# Patient Record
Sex: MALE | Race: BLACK OR AFRICAN AMERICAN | NOT HISPANIC OR LATINO | Employment: UNEMPLOYED | URBAN - METROPOLITAN AREA
[De-identification: names, ages, dates, MRNs, and addresses within clinical notes are randomized per-mention and may not be internally consistent; named-entity substitution may affect disease eponyms.]

---

## 2023-03-29 ENCOUNTER — OFFICE VISIT (OUTPATIENT)
Dept: CARDIOLOGY | Facility: CLINIC | Age: 33
End: 2023-03-29
Payer: COMMERCIAL

## 2023-03-29 VITALS
OXYGEN SATURATION: 97 % | WEIGHT: 315 LBS | HEART RATE: 75 BPM | RESPIRATION RATE: 18 BRPM | SYSTOLIC BLOOD PRESSURE: 160 MMHG | DIASTOLIC BLOOD PRESSURE: 84 MMHG | BODY MASS INDEX: 45.1 KG/M2 | HEIGHT: 70 IN

## 2023-03-29 DIAGNOSIS — I10 PRIMARY HYPERTENSION: ICD-10-CM

## 2023-03-29 DIAGNOSIS — R07.2 PRECORDIAL PAIN: Primary | ICD-10-CM

## 2023-03-29 DIAGNOSIS — E78.49 OTHER HYPERLIPIDEMIA: ICD-10-CM

## 2023-03-29 PROCEDURE — 93000 ELECTROCARDIOGRAM COMPLETE: CPT | Performed by: INTERNAL MEDICINE

## 2023-03-29 PROCEDURE — 1160F RVW MEDS BY RX/DR IN RCRD: CPT | Performed by: INTERNAL MEDICINE

## 2023-03-29 PROCEDURE — 1159F MED LIST DOCD IN RCRD: CPT | Performed by: INTERNAL MEDICINE

## 2023-03-29 PROCEDURE — 99204 OFFICE O/P NEW MOD 45 MIN: CPT | Performed by: INTERNAL MEDICINE

## 2023-03-29 RX ORDER — ERGOCALCIFEROL 1.25 MG/1
CAPSULE ORAL
COMMUNITY
Start: 2023-03-24

## 2023-03-29 RX ORDER — ATORVASTATIN CALCIUM 20 MG/1
20 TABLET, FILM COATED ORAL
COMMUNITY
Start: 2022-12-29

## 2023-03-29 RX ORDER — LOSARTAN POTASSIUM 25 MG/1
1 TABLET ORAL EVERY 12 HOURS SCHEDULED
COMMUNITY
Start: 2023-01-10

## 2023-03-29 NOTE — PROGRESS NOTES
"      CARDIOLOGY    Ayla Guidry MD    ENCOUNTER DATE:  03/29/2023    Luigi Monet / 32 y.o. / male        CHIEF COMPLAINT / REASON FOR OFFICE VISIT     Heart Problem (Enlarged Heart )      HISTORY OF PRESENT ILLNESS       HPI    Luigi Monet is a 32 y.o. male     This is a gentleman with hypertension, hyperlipidemia, and obesity. At some point he was told that he had an enlarged heart. He has never had an echo and I cannot find a chest x-ray so I am not 100% sure where this diagnosis came from. He has some sharp superficial chest pain worse with certain positions and motions that comes and goes and has been going on for about 4 years. He has gained quite a bit of weight. He has a back injury that limits his activity. His mother tells him that he stops breathing at night and she is terrified to go to sleep because of it.       REVIEW OF SYSTEMS     Review of Systems   Constitutional: Negative for chills, fever, weight gain and weight loss.   Cardiovascular: Negative for leg swelling.   Respiratory: Positive for snoring. Negative for cough and wheezing.    Hematologic/Lymphatic: Negative for bleeding problem. Does not bruise/bleed easily.   Skin: Negative for color change.   Musculoskeletal: Negative for falls, joint pain and myalgias.   Gastrointestinal: Negative for melena.   Genitourinary: Negative for hematuria.   Neurological: Negative for excessive daytime sleepiness.   Psychiatric/Behavioral: Negative for depression. The patient is not nervous/anxious.          VITAL SIGNS     Visit Vitals  /84 (BP Location: Left arm, Patient Position: Sitting, Cuff Size: Large Adult)   Pulse 75   Resp 18   Ht 176.5 cm (69.5\")   Wt (!) 152 kg (334 lb)   SpO2 97%   BMI 48.62 kg/m²         Wt Readings from Last 3 Encounters:   03/29/23 (!) 152 kg (334 lb)     Body mass index is 48.62 kg/m².      PHYSICAL EXAMINATION     Constitutional:       General: Not in acute distress.  Neck:      Vascular: No carotid " bruit or JVD.   Pulmonary:      Effort: Pulmonary effort is normal.      Breath sounds: Normal breath sounds.   Cardiovascular:      Normal rate. Regular rhythm.      Murmurs: There is no murmur.   Psychiatric:         Mood and Affect: Mood and affect normal.           REVIEWED DATA       ECG 12 Lead    Date/Time: 3/29/2023 11:04 AM  Performed by: Ayla Guidry MD  Authorized by: Ayla Guidry MD   Previous ECG: no previous ECG available  Rhythm: sinus rhythm  BPM: 75  Conduction: conduction normal  ST Segments: ST segments normal  T Waves: T waves normal    Clinical impression: normal ECG                  Lab Results   Component Value Date    BUN 22 (H) 11/15/2019    CREATININE 0.9 11/15/2019    BCR 24.4 11/15/2019    K 4.2 11/15/2019    CO2 28 11/15/2019    CALCIUM 9.9 11/15/2019    ALBUMIN 5.0 11/15/2019    BILITOT 0.7 11/15/2019    AST 35 11/15/2019    ALT 43 11/15/2019       ASSESSMENT & PLAN      Diagnosis Plan   1. Precordial pain  Home Sleep Study      2. Primary hypertension  Home Sleep Study      3. Other hyperlipidemia  Home Sleep Study        Hypertension. Blood pressure is not well controlled. I have asked him to get a blood cuff, monitor his blood pressure at home, bring in readings and his cuff to his follow-up appointment with [facility]. We can make adjustments to his blood pressure medication.   Hyperlipidemia. I do not have a lipid profile to review. He is on atorvastatin.   Obstructive sleep apnea. He has symptoms very consistent with obstructive sleep apnea. I have ordered a home sleep study. I think he will benefit from treatment of the sleep apnea.  Obesity. He understands that he needs to lose weight. A lot of his issues with blood pressure and sleep apnea have come on since he has gained weight.    He is going to follow-up with [facility] in about 4 weeks to follow-up on his blood pressure specifically and hopefully I will have the results of his sleep study.       Orders Placed This Encounter    Procedures   • ECG 12 Lead     This order was created via procedure documentation     Order Specific Question:   Release to patient     Answer:   Routine Release   • Home Sleep Study     Standing Status:   Future     Standing Expiration Date:   3/29/2024     Order Specific Question:   May take own meds     Answer:   Yes     Order Specific Question:   If any contraindications for HST are checked, patient may be recommended for in-lab testing. HST is indicated for patients in whom MARK is  suspected diagnosis.     Answer:   Does not apply           MEDICATIONS         Discharge Medications          Accurate as of March 29, 2023 11:06 AM. If you have any questions, ask your nurse or doctor.            Continue These Medications      Instructions Start Date   atorvastatin 20 MG tablet  Commonly known as: LIPITOR   20 mg, Oral, Every Night at Bedtime      losartan 25 MG tablet  Commonly known as: COZAAR   1 tablet, Oral, Every 12 Hours Scheduled      metoprolol tartrate 25 MG tablet  Commonly known as: LOPRESSOR   1 tablet, Oral, Daily      vitamin D 1.25 MG (46406 UT) capsule capsule  Commonly known as: ERGOCALCIFEROL   No dose, route, or frequency recorded.               Ayla Guidry MD  03/29/23  11:06 EDT    Part of this note may be an electronic transcription/translation of spoken language to printed text using the Dragon dictation system.

## 2023-05-03 ENCOUNTER — HOSPITAL ENCOUNTER (OUTPATIENT)
Dept: SLEEP MEDICINE | Facility: HOSPITAL | Age: 33
Discharge: HOME | End: 2023-05-03
Admitting: INTERNAL MEDICINE
Payer: COMMERCIAL

## 2023-05-03 PROCEDURE — 95806 SLEEP STUDY UNATT&RESP EFFT: CPT

## 2023-05-04 ENCOUNTER — OFFICE VISIT (OUTPATIENT)
Dept: CARDIOLOGY | Facility: CLINIC | Age: 33
End: 2023-05-04
Payer: COMMERCIAL

## 2023-05-04 VITALS
BODY MASS INDEX: 45.1 KG/M2 | HEIGHT: 70 IN | DIASTOLIC BLOOD PRESSURE: 100 MMHG | HEART RATE: 85 BPM | WEIGHT: 315 LBS | SYSTOLIC BLOOD PRESSURE: 142 MMHG | OXYGEN SATURATION: 96 %

## 2023-05-04 DIAGNOSIS — E78.49 OTHER HYPERLIPIDEMIA: ICD-10-CM

## 2023-05-04 DIAGNOSIS — R07.2 PRECORDIAL PAIN: ICD-10-CM

## 2023-05-04 DIAGNOSIS — I10 PRIMARY HYPERTENSION: Primary | ICD-10-CM

## 2023-05-04 PROCEDURE — 1159F MED LIST DOCD IN RCRD: CPT | Performed by: NURSE PRACTITIONER

## 2023-05-04 PROCEDURE — 99214 OFFICE O/P EST MOD 30 MIN: CPT | Performed by: NURSE PRACTITIONER

## 2023-05-04 PROCEDURE — 1160F RVW MEDS BY RX/DR IN RCRD: CPT | Performed by: NURSE PRACTITIONER

## 2023-05-04 NOTE — PROGRESS NOTES
"Date of Office Visit: 23  Encounter Provider: ABBY Ang  Place of Service: [facility] CARDIOLOGY  Patient Name: Luigi Monet  :1990    Chief Complaint   Patient presents with   • Follow-up   • Hypertension   • Chest Pain   :     HPI: Luigi Monet is a 32 y.o. male  With hypertension, hyperlipidemia, witnessed apnea and obesity.   He has history of prior back injury which limits his mobility.  He had a chest x-ray at [facility] in 2019 which showed cardiac and mediastinal contours were within normal limits for size.  He was seen by Dr. Ayla Guidry in 2023 and complained of sharp chest pain that was worse with position changes.  This was reportedly chronic and intermittent over 4 years.  He has prior back injury that limits his mobility.  Witnessed apnea was discussed and home sleep study was ordered.  He was advised to purchase a blood pressure cuff and start monitoring his blood pressures at home.    He presents today for reassessment.  He had his sleep study completed yesterday so we are waiting on the results of that.  He continues to complain of chest pain.  He describes it as a tenderness with certain movements.  He denies shortness of breath or edema.  He has not purchased a blood pressure cuff so he has not been able to check his blood pressure values at home. He does not add salt to food but his mother uses a lot of salt when cooking so he eats whatever she makes. His mother has recently started to cut back on added salt while cooking.       Allergies   Allergen Reactions   • Lisinopril Cough           Family and social history reviewed.     ROS  All other systems were reviewed and are negative          Objective:     Vitals:    23 1531   BP: 142/100   BP Location: Left arm   Patient Position: Sitting   Pulse: 85   SpO2: 96%   Weight: (!) 148 kg (325 lb 6.4 oz)   Height: 176.5 cm (69.5\")     Body mass index is 47.36 " kg/m².    PHYSICAL EXAM:  Pulmonary:      Effort: Pulmonary effort is normal.      Breath sounds: Normal breath sounds.   Cardiovascular:      Normal rate. Regular rhythm.         Procedures      Current Outpatient Medications   Medication Sig Dispense Refill   • atorvastatin (LIPITOR) 20 MG tablet Take 1 tablet by mouth every night at bedtime.     • losartan (COZAAR) 25 MG tablet Take 1 tablet by mouth Every 12 (Twelve) Hours.     • metoprolol tartrate (LOPRESSOR) 25 MG tablet Take 1 tablet by mouth Daily.     • vitamin D (ERGOCALCIFEROL) 1.25 MG (02602 UT) capsule capsule        No current facility-administered medications for this visit.     Assessment:       Diagnosis Plan   1. Primary hypertension  Adult Transthoracic Echo Complete W/ Cont if Necessary Per Protocol      2. Other hyperlipidemia        3. Precordial pain  Adult Transthoracic Echo Complete W/ Cont if Necessary Per Protocol           Orders Placed This Encounter   Procedures   • Adult Transthoracic Echo Complete W/ Cont if Necessary Per Protocol     Standing Status:   Future     Standing Expiration Date:   5/3/2024     Order Specific Question:   Reason for exam?     Answer:   Chest Pain     Order Specific Question:   Chest pain specification?     Answer:   Atypical Chest Pain         Plan:   1.  32-year-old gentleman with hypertension.  His blood pressure is not controlled so I will increase losartan from 25 mg to 50 mg.  He tells me he will go purchase a cuff today and I gave him a list to keep track of his blood pressure and pulse.  We will arrange blood pressure cuff check in the office towards the end of the month as well. I told him that I will come into the room briefly at his CP check.    I will also arrange for an echocardiogram to evaluate internal structure and function given his atypical symptoms.  2.  Hyperipidemia on atorvastatin followed by PCP  3.  Witnessed apnea.  He had sleep study completed yesterday so we will wait on the  results for that  4.  Obesity-BMI 47.36.  A .  He would benefit from weight loss                It has been a pleasure to participate in this patient's care.      Thank you,  ABBY Ang      **I used Dragon to dictate this note:**

## 2023-05-04 NOTE — LETTER
May 4, 2023       No Recipients    Patient: Luigi Monet   YOB: 1990   Date of Visit: 2023       Dear Dr. Morales Recipients:    Thank you for referring Luigi Monet to me for evaluation. Below are the relevant portions of my assessment and plan of care.    If you have questions, please do not hesitate to call me. I look forward to following Luigi along with you.         Sincerely,        ABBY Ang        CC:   No Recipients    Linh Murray APRN  23 1736  Signed  Date of Office Visit: 23  Encounter Provider: ABBY Ang  Place of Service: [facility] CARDIOLOGY  Patient Name: Luigi Monet  :1990    Chief Complaint   Patient presents with   • Follow-up   • Hypertension   • Chest Pain   :     HPI: Luigi Monet is a 32 y.o. male  With hypertension, hyperlipidemia, witnessed apnea and obesity.   He has history of prior back injury which limits his mobility.  He had a chest x-ray at [facility] in 2019 which showed cardiac and mediastinal contours were within normal limits for size.  He was seen by Dr. Ayla Guidry in 2023 and complained of sharp chest pain that was worse with position changes.  This was reportedly chronic and intermittent over 4 years.  He has prior back injury that limits his mobility.  Witnessed apnea was discussed and home sleep study was ordered.  He was advised to purchase a blood pressure cuff and start monitoring his blood pressures at home.    He presents today for reassessment.  He had his sleep study completed yesterday so we are waiting on the results of that.  He continues to complain of chest pain.  He describes it as a tenderness with certain movements.  He denies shortness of breath or edema.  He has not purchased a blood pressure cuff so he has not been able to check his blood pressure values at home. He does not add salt to food but his mother uses a lot of salt when  "cooking so he eats whatever she makes. His mother has recently started to cut back on added salt while cooking.       Allergies   Allergen Reactions   • Lisinopril Cough           Family and social history reviewed.     ROS  All other systems were reviewed and are negative         Objective:     Vitals:    05/04/23 1531   BP: 142/100   BP Location: Left arm   Patient Position: Sitting   Pulse: 85   SpO2: 96%   Weight: (!) 148 kg (325 lb 6.4 oz)   Height: 176.5 cm (69.5\")     Body mass index is 47.36 kg/m².    PHYSICAL EXAM:  Pulmonary:      Effort: Pulmonary effort is normal.      Breath sounds: Normal breath sounds.   Cardiovascular:      Normal rate. Regular rhythm.         Procedures      Current Outpatient Medications   Medication Sig Dispense Refill   • atorvastatin (LIPITOR) 20 MG tablet Take 1 tablet by mouth every night at bedtime.     • losartan (COZAAR) 25 MG tablet Take 1 tablet by mouth Every 12 (Twelve) Hours.     • metoprolol tartrate (LOPRESSOR) 25 MG tablet Take 1 tablet by mouth Daily.     • vitamin D (ERGOCALCIFEROL) 1.25 MG (18629 UT) capsule capsule        No current facility-administered medications for this visit.     Assessment:       Diagnosis Plan   1. Primary hypertension  Adult Transthoracic Echo Complete W/ Cont if Necessary Per Protocol      2. Other hyperlipidemia        3. Precordial pain  Adult Transthoracic Echo Complete W/ Cont if Necessary Per Protocol           Orders Placed This Encounter   Procedures   • Adult Transthoracic Echo Complete W/ Cont if Necessary Per Protocol     Standing Status:   Future     Standing Expiration Date:   5/3/2024     Order Specific Question:   Reason for exam?     Answer:   Chest Pain     Order Specific Question:   Chest pain specification?     Answer:   Atypical Chest Pain         Plan:   1.  32-year-old gentleman with hypertension.  His blood pressure is not controlled so I will increase losartan from 25 mg to 50 mg.  He tells me he will go purchase " a cuff today and I gave him a list to keep track of his blood pressure and pulse.  We will arrange blood pressure cuff check in the office towards the end of the month as well. I told him that I will come into the room briefly at his CP check.    I will also arrange for an echocardiogram to evaluate internal structure and function given his atypical symptoms.  2.  Hyperipidemia on atorvastatin followed by PCP  3.  Witnessed apnea.  He had sleep study completed yesterday so we will wait on the results for that  4.  Obesity-BMI 47.36.  A .  He would benefit from weight loss                It has been a pleasure to participate in this patient's care.      Thank you,  ABBY Ang      **I used Dragon to dictate this note:**

## 2023-05-16 ENCOUNTER — TELEPHONE (OUTPATIENT)
Dept: SLEEP MEDICINE | Facility: HOSPITAL | Age: 33
End: 2023-05-16
Payer: COMMERCIAL

## 2023-05-31 ENCOUNTER — OFFICE VISIT (OUTPATIENT)
Dept: SLEEP MEDICINE | Facility: HOSPITAL | Age: 33
End: 2023-05-31

## 2023-05-31 VITALS — OXYGEN SATURATION: 97 % | BODY MASS INDEX: 45.1 KG/M2 | HEART RATE: 75 BPM | HEIGHT: 70 IN | WEIGHT: 315 LBS

## 2023-05-31 DIAGNOSIS — G47.36 HYPOXEMIA ASSOCIATED WITH SLEEP: ICD-10-CM

## 2023-05-31 DIAGNOSIS — G47.14 HYPERSOMNIA DUE TO MEDICAL CONDITION: ICD-10-CM

## 2023-05-31 DIAGNOSIS — E66.01 CLASS 3 SEVERE OBESITY DUE TO EXCESS CALORIES WITH SERIOUS COMORBIDITY AND BODY MASS INDEX (BMI) OF 45.0 TO 49.9 IN ADULT: ICD-10-CM

## 2023-05-31 DIAGNOSIS — G47.33 OSA (OBSTRUCTIVE SLEEP APNEA): Primary | ICD-10-CM

## 2023-05-31 PROCEDURE — G0463 HOSPITAL OUTPT CLINIC VISIT: HCPCS

## 2023-05-31 NOTE — PROGRESS NOTES
[facility] New Patient/Consultation       Reason for Consultation: MARK    Patient Care Team:  Maria Del Rosario Elizabeth APRN as PCP - General (Nurse Practitioner)  Lui Gonzalez MD as Consulting Physician (Sleep Medicine)    Chief complaint: MARK    History of present illness:    Thank you for asking me to see your patient.  The patient is a 33 y.o. male who has known hypertension, hyperlipidemia, and obesity. At some point he was told that he had an enlarged heart. He has never had an echo. He has some sharp superficial chest pain worse with certain positions and motions that comes and goes and has been going on for about 4 years. He has gained quite a bit of weight. He has a back injury that limits his activity. His mother tells him that he stops breathing at night and she is terrified to go to sleep because of it.  Cardiology recommended a home sleep study.    Home sleep study performed 5/3/2023.  Severe MARK with AHI 81.8 events per hour noted.  Low oxygen saturation 40% and sleep-related hypoxia present for 144.3 minutes sleep evaluation requested.     The patient reports restless sleep and twitching and jerking in his sleep and sleep talking.  The patient goes to bed between 11 PM and midnight and awakens between 6 and 8 AM.  He is tired upon arising.  He will take 1 nap daily.  He has complaints of hypersomnolence but his [place] Sleepiness Scale is normal at 2-4.  He will use the restroom 1-3 times nightly.  His sleep is restless.      Review of Systems:    A complete review of systems was done and all were negative with the exception of the above    History:  Past Medical History:   Diagnosis Date   • BMI 45.0-49.9, adult     from [facility]'s note 12/14/22- 48.01   • HTN (hypertension)     from [facility]'s note 12/14/22   • Hyperlipidemia     from [facility]'s note 12/14/22   • MARK (obstructive sleep apnea) 05/03/2023    Home sleep study.  Weight 334 pounds.  Severe MARK with AHI  "81.8 events per hour.  Low oxygen saturation 40% and sleep-related hypoxia present for 144.3 minutes   • Vitamin D deficiency     from [facility]'s note 12/14/22   , History reviewed. No pertinent surgical history.,   Family History   Problem Relation Age of Onset   • Migraines Mother         from [facility]'s note 12/14/22   • Hypertension Mother         from [facility]'s note 12/14/22   • Depression [facility]   • Obesity Mother         [facility]   • Hypertension Brother         [facility]   • Learning disabilities Brother         [facility]   • Allergies Brother         [facility]   • Cancer Maternal Grandmother         [facility]    and   Social History     Socioeconomic History   • Marital status: Single   Tobacco Use   • Smoking status: Never     Passive exposure: Never   • Smokeless tobacco: Never   Vaping Use   • Vaping Use: Never used   Substance and Sexual Activity   • Alcohol use: Yes     Comment: beer and liquor. from [facility]'s note 12/14/22   • Drug use: Never   • Sexual activity: Defer     E-cigarette/Vaping   • E-cigarette/Vaping Use Never User      E-cigarette/Vaping Substances     E-cigarette/Vaping Devices        Social History: 3 sodas per day    Allergies:  Lisinopril     Medication Review: Metoprolol and labetalol    Vital Signs:    Vitals:    05/31/23 0858   Pulse: 75   SpO2: 97%   Weight: (!) 155 kg (342 lb)   Height: 176.5 cm (69.5\")      Body mass index is 49.78 kg/m².  Neck Circumference: 20 inches      Physical Exam:    Constitutional:  Well developed 33 y.o. male that appears in no apparent distress.  Awake & oriented times 3.  Normal mood with normal recent and remote memory and normal judgement.  Eyes:  Conjunctivae normal.  Oropharynx: Moist mucous membranes without exudate and a large tongue and class " III Mallampati airway.    Neck: Trachea midline  Respiratory: Effort is not labored  Cardiovascular: Radial pulse regular  Musculoskeletal: Gait appears normal, no digital clubbing evident, no pre-tibial edema    Results Review: I reviewed the results of the home sleep study from 5/3/2023    Impression:   Severe obstructive sleep apnea with sleep-related hypoxia by home sleep study 5/3/2023, weight 334 pounds.  The patient has some complaints of hypersomnolence but he does have a normal [place] Sleepiness Scale.    Plan:  Good sleep hygiene measures should be maintained.  Weight loss would be beneficial in this patient who has class III severe obesity by Body mass index is 49.78 kg/m²..    Pathophysiology of MARK described to the patient.  Cardiovascular complications of untreated MARK also reviewed.  Also described how untreated severe MARK can affect high blood pressure.    After reviewing all with the patient, I would recommend and the patient is agreeable to proceed with auto titrating CPAP between 10 and 20 cm water pressure.  An appropriate interface should be selected.  I described all to the patient and I answered his questions.  I will see the patient back in 2 months to review downloads and to assure compliance and efficacy.  At that time, decision to be made to check overnight oximetry to make sure that significant sleep-related hypoxia is adequately treated.    Thank you for requesting me to assist in this patient's care.    Lui Gonzalez MD  Sleep Medicine  05/31/23  09:11 EDT

## 2023-06-01 PROBLEM — G47.36 HYPOXEMIA ASSOCIATED WITH SLEEP: Status: ACTIVE | Noted: 2023-06-01

## 2023-06-01 PROBLEM — E66.01 CLASS 3 SEVERE OBESITY DUE TO EXCESS CALORIES WITH SERIOUS COMORBIDITY AND BODY MASS INDEX (BMI) OF 45.0 TO 49.9 IN ADULT: Status: ACTIVE | Noted: 2023-06-01

## 2023-06-01 PROBLEM — G47.14 HYPERSOMNIA DUE TO MEDICAL CONDITION: Status: ACTIVE | Noted: 2023-06-01

## 2023-06-01 PROBLEM — G47.33 OSA (OBSTRUCTIVE SLEEP APNEA): Status: ACTIVE | Noted: 2023-05-03
